# Patient Record
Sex: FEMALE | Race: WHITE | NOT HISPANIC OR LATINO | URBAN - METROPOLITAN AREA
[De-identification: names, ages, dates, MRNs, and addresses within clinical notes are randomized per-mention and may not be internally consistent; named-entity substitution may affect disease eponyms.]

---

## 2017-05-24 ENCOUNTER — FOLLOW UP ESTABLISHED (OUTPATIENT)
Dept: URBAN - METROPOLITAN AREA CLINIC 64 | Facility: CLINIC | Age: 72
End: 2017-05-24
Payer: MEDICARE

## 2017-05-24 PROCEDURE — 92134 CPTRZ OPH DX IMG PST SGM RTA: CPT | Performed by: OPTOMETRIST

## 2017-05-24 PROCEDURE — 92014 COMPRE OPH EXAM EST PT 1/>: CPT | Performed by: OPTOMETRIST

## 2017-05-24 PROCEDURE — 92015 DETERMINE REFRACTIVE STATE: CPT | Performed by: OPTOMETRIST

## 2017-05-24 ASSESSMENT — KERATOMETRY
OS: 258.56
OD: 43.02

## 2017-05-24 ASSESSMENT — INTRAOCULAR PRESSURE
OD: 12
OS: 10

## 2018-06-04 ENCOUNTER — FOLLOW UP ESTABLISHED (OUTPATIENT)
Dept: URBAN - METROPOLITAN AREA CLINIC 64 | Facility: CLINIC | Age: 73
End: 2018-06-04
Payer: MEDICARE

## 2018-06-04 PROCEDURE — 92134 CPTRZ OPH DX IMG PST SGM RTA: CPT | Performed by: OPTOMETRIST

## 2018-06-04 PROCEDURE — 92014 COMPRE OPH EXAM EST PT 1/>: CPT | Performed by: OPTOMETRIST

## 2018-06-04 ASSESSMENT — VISUAL ACUITY
OD: 20/20
OS: 20/20

## 2018-06-04 ASSESSMENT — INTRAOCULAR PRESSURE
OS: 16
OD: 15

## 2018-06-04 ASSESSMENT — KERATOMETRY
OD: 42.77
OS: 43.53

## 2019-05-29 ENCOUNTER — FOLLOW UP ESTABLISHED (OUTPATIENT)
Dept: URBAN - METROPOLITAN AREA CLINIC 64 | Facility: CLINIC | Age: 74
End: 2019-05-29
Payer: MEDICARE

## 2019-05-29 DIAGNOSIS — H35.3131 NEXDTVE AGE-RELATED MCLR DEGN, BILATERAL, EARLY DRY STAGE: Primary | ICD-10-CM

## 2019-05-29 PROCEDURE — 92014 COMPRE OPH EXAM EST PT 1/>: CPT | Performed by: OPTOMETRIST

## 2019-05-29 PROCEDURE — 92134 CPTRZ OPH DX IMG PST SGM RTA: CPT | Performed by: OPTOMETRIST

## 2019-05-29 ASSESSMENT — VISUAL ACUITY
OS: 20/20
OD: 20/20

## 2019-05-29 ASSESSMENT — INTRAOCULAR PRESSURE
OS: 17
OD: 17

## 2019-05-29 ASSESSMENT — KERATOMETRY
OD: 42.83
OS: 43.35

## 2020-09-11 ENCOUNTER — FOLLOW UP ESTABLISHED (OUTPATIENT)
Dept: URBAN - METROPOLITAN AREA CLINIC 64 | Facility: CLINIC | Age: 75
End: 2020-09-11
Payer: MEDICARE

## 2020-09-11 PROCEDURE — 92014 COMPRE OPH EXAM EST PT 1/>: CPT | Performed by: OPTOMETRIST

## 2020-09-11 ASSESSMENT — INTRAOCULAR PRESSURE
OD: 13
OS: 12

## 2021-08-19 ENCOUNTER — OFFICE VISIT (OUTPATIENT)
Dept: URBAN - METROPOLITAN AREA CLINIC 64 | Facility: CLINIC | Age: 76
End: 2021-08-19
Payer: MEDICARE

## 2021-08-19 DIAGNOSIS — Z96.1 PRESENCE OF INTRAOCULAR LENS: Primary | ICD-10-CM

## 2021-08-19 PROCEDURE — 92134 CPTRZ OPH DX IMG PST SGM RTA: CPT | Performed by: OPTOMETRIST

## 2021-08-19 PROCEDURE — 99214 OFFICE O/P EST MOD 30 MIN: CPT | Performed by: OPTOMETRIST

## 2021-08-19 ASSESSMENT — INTRAOCULAR PRESSURE
OS: 16
OD: 19

## 2021-08-19 NOTE — IMPRESSION/PLAN
Impression: Nonexudative age-related macular degeneration, bilateral, early dry stage: H35.3131. Plan: Macular degeneration dry type, with stable vision. Patient was instructed on the use of the Amsler grid and will continue monitoring vision.  recommends taking AREDS vitamins and antioxidants as they have been shown to reduce the progression of macular degeneration. Patient was told to call office immediately with vision changes. Monitor yearly. OCT performed today, similar results to last year.

## 2021-08-19 NOTE — IMPRESSION/PLAN
Impression: Presence of intraocular lens: Z96.1. Plan: Discussed diagnosis in detail with patient. Will continue to observe condition and or symptoms.

## 2022-09-08 ENCOUNTER — OFFICE VISIT (OUTPATIENT)
Dept: URBAN - METROPOLITAN AREA CLINIC 64 | Facility: CLINIC | Age: 77
End: 2022-09-08
Payer: MEDICARE

## 2022-09-08 DIAGNOSIS — H35.3131 NONEXUDATIVE AGE-RELATED MACULAR DEGENERATION, BILATERAL, EARLY DRY STAGE: Primary | ICD-10-CM

## 2022-09-08 PROCEDURE — 99213 OFFICE O/P EST LOW 20 MIN: CPT | Performed by: OPTOMETRIST

## 2022-09-08 ASSESSMENT — VISUAL ACUITY
OS: 20/30
OD: 20/20

## 2022-09-08 ASSESSMENT — INTRAOCULAR PRESSURE
OS: 9
OD: 13

## 2022-09-08 NOTE — IMPRESSION/PLAN
Impression: Nonexudative age-related macular degeneration, bilateral, early dry stage: H35.3131. Plan: Macular degeneration dry type, with stable vision. Patient was instructed on the use of the Amsler grid and will continue monitoring vision.  recommends taking AREDS vitamins and antioxidants as they have been shown to reduce the progression of macular degeneration.  Patient was told to call office immediately

## 2022-10-21 ENCOUNTER — OFFICE VISIT (OUTPATIENT)
Dept: URBAN - METROPOLITAN AREA CLINIC 85 | Facility: CLINIC | Age: 77
End: 2022-10-21
Payer: MEDICARE

## 2022-10-21 DIAGNOSIS — H35.3131 NONEXUDATIVE AGE-RELATED MACULAR DEGENERATION, BILATERAL, EARLY DRY STAGE: ICD-10-CM

## 2022-10-21 DIAGNOSIS — H47.011 ISCHEMIC OPTIC NEUROPATHY, RIGHT EYE: Primary | ICD-10-CM

## 2022-10-21 DIAGNOSIS — H43.393 OTHER VITREOUS OPACITIES, BILATERAL: ICD-10-CM

## 2022-10-21 PROCEDURE — 92014 COMPRE OPH EXAM EST PT 1/>: CPT | Performed by: OPTOMETRIST

## 2022-10-21 PROCEDURE — 92133 CPTRZD OPH DX IMG PST SGM ON: CPT | Performed by: OPTOMETRIST

## 2022-10-21 PROCEDURE — 92134 CPTRZ OPH DX IMG PST SGM RTA: CPT | Performed by: OPTOMETRIST

## 2022-10-21 ASSESSMENT — VISUAL ACUITY
OD: 20/20
OS: 20/25

## 2022-10-21 ASSESSMENT — INTRAOCULAR PRESSURE
OS: 15
OD: 16

## 2022-10-21 NOTE — IMPRESSION/PLAN
Impression: Ischemic optic neuropathy, right eye: H47.011. Plan: Discussed optic nerve edema, OD, most consistent with ION. Discussed potential association of current symptoms and/or exam findings with systemic condition (Giant Cell Arteritis/Temporal Arteritis). Discussed need for consult with family medical doctor/internal medicine to rule out Giant Cell Arteritis ASAP. No other current constitutional symptoms of GCA. Discussed association of ION with HTN, diabetes, cholesterol issues, etc. and need to follow up ASAP with FMD to evaluate for other microvascular etiologies. If unable to be seen by family medical doctor on emergent basis, discussed need to be evaluated/treated at local emergency room on emergent basis due to potential ramifications. RTC here next week for follow up with careful pupil check for APD, color VA, and possible 24-2 CVF or RTC ASAP should they experience a decrease in vision, pain, or any worsening of condition.

## 2022-10-21 NOTE — IMPRESSION/PLAN
Impression: Nonexudative age-related macular degeneration, bilateral, early dry stage: H35.3131. Plan: Macular degeneration, dry type. Will continue to monitor vision and the patient has been instructed to call with any vision changes. Continue AREDS II, recommendations and studies showing potential slowing of progression. Continue home [address] [facility] (AG) monitoring QD. Pt. instructed to call ASAP if acute VA change or change on AG, as that may indicate conversion to exudative macular degeneration.

## 2022-10-27 ENCOUNTER — OFFICE VISIT (OUTPATIENT)
Dept: URBAN - METROPOLITAN AREA CLINIC 85 | Facility: CLINIC | Age: 77
End: 2022-10-27
Payer: MEDICARE

## 2022-10-27 DIAGNOSIS — H47.011 ISCHEMIC OPTIC NEUROPATHY, RIGHT EYE: Primary | ICD-10-CM

## 2022-10-27 DIAGNOSIS — H35.3131 NONEXUDATIVE AGE-RELATED MACULAR DEGENERATION, BILATERAL, EARLY DRY STAGE: ICD-10-CM

## 2022-10-27 PROCEDURE — 92002 INTRM OPH EXAM NEW PATIENT: CPT | Performed by: OPHTHALMOLOGY

## 2022-10-27 PROCEDURE — 92083 EXTENDED VISUAL FIELD XM: CPT | Performed by: OPHTHALMOLOGY

## 2022-10-27 ASSESSMENT — INTRAOCULAR PRESSURE
OD: 15
OS: 14

## 2022-10-27 NOTE — IMPRESSION/PLAN
Impression: Nonexudative age-related macular degeneration, bilateral, early dry stage: H35.3131. Plan: Macular degeneration, dry type. Will continue to monitor vision and the patient has been instructed to call with any vision changes. Continue AREDS II, recommendations and studies showing potential slowing of progression. Continue home [address] [facility] (AG) monitoring QD. Pt. instructed to call ASAP if acute VA change or change on AG, as that may indicate conversion to exudative macular degeneration.

## 2022-10-27 NOTE — IMPRESSION/PLAN
Impression: Ischemic optic neuropathy, right eye: H47.011. Plan: Discussed optic nerve edema, OD, most consistent with ION. Discussed low suspicion of Giant Cell Arteritis/Temporal Arteritis. See PCP/FNP as scheduled for consult. No other current constitutional symptoms of GCA. Discussed association of ION with HTN, diabetes, cholesterol issues, etc. and need to follow up ASAP with FMD to evaluate for other microvascular etiologies. RTC in 3 weeks for follow up.

## 2022-11-17 ENCOUNTER — OFFICE VISIT (OUTPATIENT)
Dept: URBAN - METROPOLITAN AREA CLINIC 85 | Facility: CLINIC | Age: 77
End: 2022-11-17
Payer: MEDICARE

## 2022-11-17 DIAGNOSIS — H47.011 ISCHEMIC OPTIC NEUROPATHY, RIGHT EYE: Primary | ICD-10-CM

## 2022-11-17 PROCEDURE — 92012 INTRM OPH EXAM EST PATIENT: CPT | Performed by: OPHTHALMOLOGY

## 2022-11-17 ASSESSMENT — INTRAOCULAR PRESSURE
OD: 16
OS: 15

## 2023-01-12 ENCOUNTER — OFFICE VISIT (OUTPATIENT)
Dept: URBAN - METROPOLITAN AREA CLINIC 85 | Facility: CLINIC | Age: 78
End: 2023-01-12
Payer: MEDICARE

## 2023-01-12 DIAGNOSIS — H47.011 ISCHEMIC OPTIC NEUROPATHY, RIGHT EYE: Primary | ICD-10-CM

## 2023-01-12 DIAGNOSIS — H35.3131 NONEXUDATIVE AGE-RELATED MACULAR DEGENERATION, BILATERAL, EARLY DRY STAGE: ICD-10-CM

## 2023-01-12 PROCEDURE — 99212 OFFICE O/P EST SF 10 MIN: CPT | Performed by: OPHTHALMOLOGY

## 2023-01-12 ASSESSMENT — INTRAOCULAR PRESSURE
OD: 17
OS: 16

## 2023-01-12 NOTE — IMPRESSION/PLAN
Impression: Ischemic optic neuropathy, right eye: H47.011. Non-arteretic Plan: Discussed optic nerve edema, OD, most consistent with non-arteretic ION. RTC in 4-6 months for CEE or   ASAP if there should be any decrease in vision, pain, or any worsening of condition.

## 2023-01-12 NOTE — IMPRESSION/PLAN
Impression: Nonexudative age-related macular degeneration, bilateral, early dry stage: H35.3131. Plan: Macular degeneration, dry type. Will continue to monitor vision and the patient has been instructed to call with any vision changes. Continue AREDS II, recommendations and studies showing potential slowing of progression. Continue home [address] [facility] (AG) monitoring QD. Pt. instructed to call ASAP if acute VA change or change on AG, as that may indicate conversion to exudative macular degeneration.

## 2023-03-30 ENCOUNTER — OFFICE VISIT (OUTPATIENT)
Dept: URBAN - METROPOLITAN AREA CLINIC 85 | Facility: CLINIC | Age: 78
End: 2023-03-30
Payer: MEDICARE

## 2023-03-30 DIAGNOSIS — H35.3131 NONEXUDATIVE AGE-RELATED MACULAR DEGENERATION, BILATERAL, EARLY DRY STAGE: Primary | ICD-10-CM

## 2023-03-30 DIAGNOSIS — H47.011 ISCHEMIC OPTIC NEUROPATHY, RIGHT EYE: ICD-10-CM

## 2023-03-30 DIAGNOSIS — H43.393 OTHER VITREOUS OPACITIES, BILATERAL: ICD-10-CM

## 2023-03-30 PROCEDURE — 92014 COMPRE OPH EXAM EST PT 1/>: CPT | Performed by: OPHTHALMOLOGY

## 2023-03-30 ASSESSMENT — INTRAOCULAR PRESSURE
OD: 16
OS: 16

## 2023-03-30 NOTE — IMPRESSION/PLAN
Impression: Nonexudative age-related macular degeneration, bilateral, early dry stage: H35.3131. Plan: Macular degeneration, dry type with stable vision. Will continue to monitor vision and the patient has been instructed to call with any vision changes. Continue taking Preservision and home use of amsler grid.

## 2023-07-10 ENCOUNTER — OFFICE VISIT (OUTPATIENT)
Dept: URBAN - METROPOLITAN AREA CLINIC 64 | Facility: LOCATION | Age: 78
End: 2023-07-10
Payer: MEDICARE

## 2023-07-10 DIAGNOSIS — H26.491 OTHER SECONDARY CATARACT, RIGHT EYE: ICD-10-CM

## 2023-07-10 DIAGNOSIS — H43.393 OTHER VITREOUS OPACITIES, BILATERAL: ICD-10-CM

## 2023-07-10 DIAGNOSIS — H35.3131 NONEXUDATIVE AGE-RELATED MACULAR DEGENERATION, BILATERAL, EARLY DRY STAGE: Primary | ICD-10-CM

## 2023-07-10 DIAGNOSIS — Z96.1 PRESENCE OF PSEUDOPHAKIA: ICD-10-CM

## 2023-07-10 DIAGNOSIS — H47.011 ISCHEMIC OPTIC NEUROPATHY, RIGHT EYE: ICD-10-CM

## 2023-07-10 PROCEDURE — 99214 OFFICE O/P EST MOD 30 MIN: CPT | Performed by: OPHTHALMOLOGY

## 2023-07-10 PROCEDURE — 92134 CPTRZ OPH DX IMG PST SGM RTA: CPT | Performed by: OPHTHALMOLOGY

## 2023-07-10 ASSESSMENT — INTRAOCULAR PRESSURE
OS: 18
OD: 18

## 2023-07-10 NOTE — IMPRESSION/PLAN
Impression: Other secondary cataract, right eye: H26.491. Plan: Trace. Discussed diagnosis in detail with patient. No treatment is required at this time. Will continue to observe condition and or symptoms.

## 2023-07-10 NOTE — IMPRESSION/PLAN
Impression: Nonexudative age-related macular degeneration, bilateral, early dry stage: H35.3131. Plan: Macular degeneration, dry type with stable vision. Will continue to monitor vision and the patient has been instructed to call with any vision changes. Patient to continue taking AREDS-2 and monitoring vision with use of amsler grid. Mac OCT done today, stable OU.

## 2023-07-10 NOTE — IMPRESSION/PLAN
Impression: Ischemic optic neuropathy, right eye: H47.011. Plan: Discussed. Optic nerve edema resolved, elevated on exam OD. RTC ASAP if there should be any decrease in vision, pain, any worsening of condition.

## 2023-07-10 NOTE — IMPRESSION/PLAN
Impression: Presence of pseudophakia: Z96.1. Plan: S/P CE IOL OU. Discussed with patient. Recommend monitoring yearly.

## 2023-09-12 ENCOUNTER — OFFICE VISIT (OUTPATIENT)
Dept: URBAN - METROPOLITAN AREA CLINIC 85 | Facility: CLINIC | Age: 78
End: 2023-09-12
Payer: MEDICARE

## 2023-09-12 DIAGNOSIS — H35.3221 EXDTVE AGE-REL MCLR DEGN, LEFT EYE, WITH ACTV CHRDL NEOVAS: Primary | ICD-10-CM

## 2023-09-12 PROCEDURE — 99214 OFFICE O/P EST MOD 30 MIN: CPT | Performed by: OPTOMETRIST

## 2023-09-12 PROCEDURE — 92134 CPTRZ OPH DX IMG PST SGM RTA: CPT | Performed by: OPTOMETRIST

## 2023-09-12 ASSESSMENT — INTRAOCULAR PRESSURE
OD: 17
OS: 16

## 2023-09-12 ASSESSMENT — VISUAL ACUITY
OS: 20/60
OD: 20/30

## 2023-10-13 ENCOUNTER — OFFICE VISIT (OUTPATIENT)
Dept: URBAN - METROPOLITAN AREA CLINIC 85 | Facility: CLINIC | Age: 78
End: 2023-10-13
Payer: MEDICARE

## 2023-10-13 DIAGNOSIS — H35.3134 NONEXUDATIVE MACULAR DEGENERATION, ADVANCED ATROPHIC WITH SUBFOVEAL INVOLVEMENT, BILATERAL: Primary | ICD-10-CM

## 2023-10-13 PROCEDURE — 99214 OFFICE O/P EST MOD 30 MIN: CPT | Performed by: OPHTHALMOLOGY

## 2023-10-13 PROCEDURE — 92235 FLUORESCEIN ANGRPH MLTIFRAME: CPT | Performed by: OPHTHALMOLOGY

## 2023-10-13 PROCEDURE — 92134 CPTRZ OPH DX IMG PST SGM RTA: CPT | Performed by: OPHTHALMOLOGY

## 2023-10-13 ASSESSMENT — INTRAOCULAR PRESSURE
OS: 16
OD: 16

## 2024-06-04 ENCOUNTER — OFFICE VISIT (OUTPATIENT)
Dept: URBAN - METROPOLITAN AREA CLINIC 85 | Facility: CLINIC | Age: 79
End: 2024-06-04
Payer: MEDICARE

## 2024-06-04 DIAGNOSIS — H35.3134 NONEXUDATIVE AGE-RELATED MACULAR DEGENERATION, BILATERAL, ADVANCED ATROPHIC WITH SUBFOVEAL INVOLVEMENT: Primary | ICD-10-CM

## 2024-06-04 PROCEDURE — 92134 CPTRZ OPH DX IMG PST SGM RTA: CPT | Performed by: OPHTHALMOLOGY

## 2024-06-04 PROCEDURE — 99214 OFFICE O/P EST MOD 30 MIN: CPT | Performed by: OPHTHALMOLOGY

## 2024-06-04 ASSESSMENT — INTRAOCULAR PRESSURE
OD: 17
OS: 13

## 2024-12-11 ENCOUNTER — OFFICE VISIT (OUTPATIENT)
Dept: URBAN - METROPOLITAN AREA CLINIC 85 | Facility: CLINIC | Age: 79
End: 2024-12-11
Payer: MEDICARE

## 2024-12-11 DIAGNOSIS — H35.3134 NONEXUDATIVE AGE-RELATED MACULAR DEGENERATION, BILATERAL, ADVANCED ATROPHIC WITH SUBFOVEAL INVOLVEMENT: Primary | ICD-10-CM

## 2024-12-11 PROCEDURE — 99213 OFFICE O/P EST LOW 20 MIN: CPT | Performed by: OPHTHALMOLOGY

## 2024-12-11 PROCEDURE — 92134 CPTRZ OPH DX IMG PST SGM RTA: CPT | Performed by: OPHTHALMOLOGY

## 2024-12-11 ASSESSMENT — INTRAOCULAR PRESSURE
OD: 16
OS: 16

## 2025-05-23 ENCOUNTER — OFFICE VISIT (OUTPATIENT)
Dept: URBAN - METROPOLITAN AREA CLINIC 85 | Facility: CLINIC | Age: 80
End: 2025-05-23
Payer: MEDICARE

## 2025-05-23 DIAGNOSIS — H35.3123 NEXDTVE AGE-REL MCLR DEGN, L EYE, ADV ATRPC W/O SBFVL INVL: Primary | ICD-10-CM

## 2025-05-23 PROCEDURE — 99213 OFFICE O/P EST LOW 20 MIN: CPT | Performed by: OPHTHALMOLOGY

## 2025-05-23 PROCEDURE — 67028 INJECTION EYE DRUG: CPT | Performed by: OPHTHALMOLOGY

## 2025-05-23 PROCEDURE — 92134 CPTRZ OPH DX IMG PST SGM RTA: CPT | Performed by: OPHTHALMOLOGY

## 2025-05-23 ASSESSMENT — INTRAOCULAR PRESSURE
OS: 16
OD: 16

## 2025-08-20 ENCOUNTER — OFFICE VISIT (OUTPATIENT)
Dept: URBAN - METROPOLITAN AREA CLINIC 85 | Facility: CLINIC | Age: 80
End: 2025-08-20
Payer: MEDICARE

## 2025-08-20 DIAGNOSIS — H35.3123 NEXDTVE AGE-REL MCLR DEGN, L EYE, ADV ATRPC W/O SBFVL INVL: Primary | ICD-10-CM

## 2025-08-20 PROCEDURE — 67028 INJECTION EYE DRUG: CPT | Performed by: OPHTHALMOLOGY

## 2025-08-20 ASSESSMENT — INTRAOCULAR PRESSURE
OD: 17
OS: 15